# Patient Record
Sex: MALE | Race: WHITE | NOT HISPANIC OR LATINO | Employment: UNEMPLOYED | URBAN - METROPOLITAN AREA
[De-identification: names, ages, dates, MRNs, and addresses within clinical notes are randomized per-mention and may not be internally consistent; named-entity substitution may affect disease eponyms.]

---

## 2017-12-05 ENCOUNTER — OFFICE VISIT (OUTPATIENT)
Dept: PSYCHIATRY | Facility: CLINIC | Age: 57
End: 2017-12-05

## 2017-12-05 DIAGNOSIS — G89.29 OTHER CHRONIC PAIN: Primary | ICD-10-CM

## 2017-12-05 DIAGNOSIS — F43.21 SITUATIONAL DEPRESSION: ICD-10-CM

## 2017-12-05 PROCEDURE — 90791 PSYCH DIAGNOSTIC EVALUATION: CPT | Performed by: PSYCHOLOGIST

## 2017-12-10 NOTE — PROGRESS NOTES
PROGRESS NOTE  Data:  Good Null is a 57 y.o. male who met with the undersigned for a scheduled individual outpatient psychotherapy session from 3:00 - 3:45pm.      Clinical Maneuvering/Intervention:      Pt talked about struggling with chronic pain and suffering from several other health problems (fibromyalgia, high blood pressure, high cholesterol, etc). He was pleasant, talkative, and forthcoming with personal information. A psychological evaluation was conducted in order to assess past and current level of functioning. Areas assessed included, but were not limited to: perception of social support, perception of ability to face and deal with challenges in life (positive functioning), anxiety symptoms, depressive symptoms, perspective on beliefs/belief system, coping skills for stress, intelligence level, etc. Therapeutic rapport was established quite easily. Interventions conducted today were geared towards pain symptom management, perspective taking, and identifying coping skills for stress. Education was also provided as to the nature of counseling with this therapist and what to expect in subsequent sessions. A treatment plan was initiated tailored to meeting pt’s presenting needs. The pt was encouraged to return for additional sessions as needed/desired by the pt.     Assessment      The pt presents as suffering from chronic pain and situational depression. He has several good coping skills for stress including a satisfying marriage, strong amy in God, and has an ability to use humor in difficult situations, however he tends to feel defeated and depressed due to not being able to work/provide for his family (which, in turn, impairs his self-esteem).     Despite suffering from situational depression, from a psychological standpoint, the pt presents as a good candidate to receive the pain pump (or neurostimulator should this later be decided) as he is motivated to receive it, trusts his pain physician, and  is likely to follow his physician's instructions. He would benefit from additional clarification about steps involved in the trial for the pump as he seemed nervous about the odds that it will help diminish his pain.       Mental Status Exam  Hygiene:  good  Dress: normal  Attitude:  Cooperative and proactive  Motor Activity: normal  Speech: normal  Mood:  depressed and nervous  Affect:  congruent  Thought Processes: normal  Thought Content:  normal  Suicidal Thoughts:  not endorsed  Homicidal Thoughts:  not endorsed  Crisis Safety Plan: not needed   Hallucinations:  none      Patient's Support Network Includes:  family, friends      Progress toward goal: there is evidence to suggest that he is becoming more proactive in improving the quality of his life by addressing emotional issues and getting pain treatment.       Functional Status: moderate      Prognosis: depression situational, chronic pain      Plan      Pt will adhere to medication regimen as prescribed. Pt will follow up with his pain physician in order to get pain treated and follow up with a psychotherapist in order to learn ways to better manage his moods.     Disha Diaz, PhD, LP

## 2018-07-09 ENCOUNTER — OFFICE VISIT (OUTPATIENT)
Dept: NEUROSURGERY | Facility: CLINIC | Age: 58
End: 2018-07-09

## 2018-07-09 VITALS
TEMPERATURE: 98 F | DIASTOLIC BLOOD PRESSURE: 80 MMHG | SYSTOLIC BLOOD PRESSURE: 140 MMHG | BODY MASS INDEX: 42.68 KG/M2 | WEIGHT: 250 LBS | HEIGHT: 64 IN

## 2018-07-09 DIAGNOSIS — M54.50 CHRONIC MIDLINE LOW BACK PAIN WITHOUT SCIATICA: Primary | ICD-10-CM

## 2018-07-09 DIAGNOSIS — G89.29 CHRONIC MIDLINE LOW BACK PAIN WITHOUT SCIATICA: Primary | ICD-10-CM

## 2018-07-09 PROCEDURE — 99214 OFFICE O/P EST MOD 30 MIN: CPT | Performed by: PHYSICIAN ASSISTANT

## 2018-07-09 RX ORDER — OMEPRAZOLE 40 MG/1
40 CAPSULE, DELAYED RELEASE ORAL DAILY
COMMUNITY

## 2018-07-09 RX ORDER — MULTIVITAMIN
1 TABLET ORAL DAILY
COMMUNITY

## 2018-07-09 RX ORDER — PREGABALIN 150 MG/1
150 CAPSULE ORAL 2 TIMES DAILY
COMMUNITY

## 2018-07-09 RX ORDER — VERAPAMIL HYDROCHLORIDE 240 MG/1
240 CAPSULE, EXTENDED RELEASE ORAL NIGHTLY
COMMUNITY
End: 2018-11-05

## 2018-07-09 RX ORDER — BUSPIRONE HYDROCHLORIDE 15 MG/1
15 TABLET ORAL 3 TIMES DAILY
COMMUNITY

## 2018-07-09 RX ORDER — CITALOPRAM 40 MG/1
40 TABLET ORAL DAILY
COMMUNITY

## 2018-07-09 RX ORDER — SPIRONOLACTONE 25 MG/1
25 TABLET ORAL DAILY
COMMUNITY

## 2018-07-09 RX ORDER — ATORVASTATIN CALCIUM 40 MG/1
40 TABLET, FILM COATED ORAL DAILY
COMMUNITY

## 2018-07-09 RX ORDER — RANITIDINE 300 MG/1
300 TABLET ORAL NIGHTLY
COMMUNITY

## 2018-07-09 NOTE — PROGRESS NOTES
Patient: Good Null  : 1960    Primary Care Provider: Ty Gutierrez MD      Chief Complaint: Low back pain    History of Present Illness:       Patient is a 58-year-old male who has had greater than 30 years of chronic back pain.  Patient has been recently treated by Dr. cabrera, but has not received any injections.  Dr. Mccloud had discussed a pain pump trial as well, but all these seem to be rejected by his insurance.    In the last 2 or 3 months.  Patient's pain has gotten much worse with much activity at all.  He has exquisite mid to low back pain and no real radicular symptoms.  Patient also states he has neck pain but has no radiculopathy in upper extremities.    Patient states that there is no activities that have helped him get better.  Patient refuses to take any opioid narcotics.  Patient also does not care for her nonsteroidal anti-inflammatories.    Patient presents today with x-rays of his lumbar spine and no new films.  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  Patient has not done formal physical therapy.    Review of Systems   Constitutional: Positive for activity change and fatigue. Negative for appetite change, chills, diaphoresis, fever and unexpected weight change.   HENT: Negative for congestion, dental problem, drooling, ear discharge, ear pain, facial swelling, hearing loss, mouth sores, nosebleeds, postnasal drip, rhinorrhea, sinus pressure, sneezing, sore throat, tinnitus, trouble swallowing and voice change.    Eyes: Positive for pain. Negative for photophobia, discharge, redness, itching and visual disturbance.   Respiratory: Positive for cough. Negative for apnea, choking, chest tightness, shortness of breath, wheezing and stridor.    Cardiovascular: Negative for chest pain, palpitations and leg swelling.   Gastrointestinal: Negative for abdominal distention, abdominal pain, anal bleeding, blood in stool,  "constipation, diarrhea, nausea, rectal pain and vomiting.   Endocrine: Negative for cold intolerance, heat intolerance, polydipsia, polyphagia and polyuria.   Genitourinary: Negative for decreased urine volume, difficulty urinating, dysuria, enuresis, flank pain, frequency, genital sores, hematuria and urgency.   Musculoskeletal: Positive for arthralgias, back pain, gait problem, joint swelling, myalgias, neck pain and neck stiffness.   Skin: Negative for color change, pallor, rash and wound.   Allergic/Immunologic: Negative for environmental allergies, food allergies and immunocompromised state.   Neurological: Positive for dizziness and headaches. Negative for tremors, seizures, syncope, facial asymmetry, speech difficulty, weakness, light-headedness and numbness.   Hematological: Negative for adenopathy. Does not bruise/bleed easily.   Psychiatric/Behavioral: Negative for agitation, behavioral problems, confusion, decreased concentration, dysphoric mood, hallucinations, self-injury, sleep disturbance and suicidal ideas. The patient is not nervous/anxious and is not hyperactive.    All other systems reviewed and are negative.      Past Medical History:     Past Medical History:   Diagnosis Date   • Arthritis    • Fibromyalgia    • Hypertension    • Low back pain        Family History:   History reviewed. No pertinent family history.    Social History:    reports that he has never smoked. His smokeless tobacco use includes Snuff. He reports that he does not drink alcohol or use drugs.   SMOKING STATUS: Nonsmoker    Surgical History:     Past Surgical History:   Procedure Laterality Date   • WRIST SURGERY         Allergies:   Codeine and Penicillins    Physical Exam:    Vital Signs:/80 (BP Location: Left arm)   Temp 98 °F (36.7 °C)   Ht 162.6 cm (64\")   Wt 113 kg (250 lb)   BMI 42.91 kg/m²    BMI: Body mass index is 42.91 kg/m².    GENEREAL:   The patient is in no acute distress, and is able to answer all " questions appropriately.  HEENT: Pupils are equal and reactive to light.  Visual fields are full.  Extraocular movements are intact without nystagmus.  There is no evidence of trauma.  Neck: Supple without lymphadenopathy  Cardiovascular: Regular rate and rhythm   Abdomen: Soft, non-tender, non-distended.    Musculoskeletal: The patient’s strength is intact in upper and lower extremities upon direct testing.   strength is 5 out of 5 bilaterally. Shoulder abduction is 5 out of 5.  Dorsiflexion and plantarflexion are equal bilaterally as well as the hip flexion against resistance.  The patient’s gait is normal without antalgia.  Neurologic: The patient is alert and oriented by 3.  Recent memory, language, attention span, and fund of knowledge are all with within normal limits.  There is no evidence of central motor drift. No facial droop.  No difficulty with rapid alternating movements.  Sensation is equal bilaterally with no deficit.    Reflexes are 2+ at biceps, triceps, brachioradialis, as well as the patellar and Achilles tendon bilaterally.      Medical Decision Making    Data Review:   X-rays of the lumbar spine were reviewed, as well as MRI of the cervical, thoracic and lumbar spine from January 2017.  No real issues seen on the MRIs from 2017, however, they are a year and 8 months old    Diagnosis:   Chronic low back pain    Treatment Options:   I discussed with the patient.  The back pain is very difficult to treat, especially surgically.  I have ordered a lumbar spine flexion extension x-ray as well as MRI of the lumbar spine.  We also put the patient in physical therapy in the time being.  I'll see the patient back after these studies are performed.  I have reviewed with the patient.  Signs and symptoms of necessitate a referral back to us in a more urgent fashion.    It has been a pleasure providing neurosurgical care.    Allen Moss PA-C      No diagnosis found.

## 2018-09-24 ENCOUNTER — HOSPITAL ENCOUNTER (OUTPATIENT)
Dept: GENERAL RADIOLOGY | Facility: HOSPITAL | Age: 58
Discharge: HOME | End: 2018-09-24
Admitting: PHYSICIAN ASSISTANT

## 2018-09-24 ENCOUNTER — OFFICE VISIT (OUTPATIENT)
Dept: NEUROSURGERY | Facility: CLINIC | Age: 58
End: 2018-09-24

## 2018-09-24 DIAGNOSIS — M54.50 CHRONIC MIDLINE LOW BACK PAIN WITHOUT SCIATICA: Primary | ICD-10-CM

## 2018-09-24 DIAGNOSIS — M54.50 CHRONIC MIDLINE LOW BACK PAIN WITHOUT SCIATICA: ICD-10-CM

## 2018-09-24 DIAGNOSIS — G89.29 CHRONIC MIDLINE LOW BACK PAIN WITHOUT SCIATICA: Primary | ICD-10-CM

## 2018-09-24 DIAGNOSIS — G89.29 CHRONIC MIDLINE LOW BACK PAIN WITHOUT SCIATICA: ICD-10-CM

## 2018-09-24 PROCEDURE — 99213 OFFICE O/P EST LOW 20 MIN: CPT | Performed by: PHYSICIAN ASSISTANT

## 2018-09-24 PROCEDURE — 72120 X-RAY BEND ONLY L-S SPINE: CPT

## 2018-09-24 RX ORDER — TRIAMTERENE AND HYDROCHLOROTHIAZIDE 37.5; 25 MG/1; MG/1
1 TABLET ORAL DAILY
Refills: 2 | COMMUNITY
Start: 2018-08-30

## 2018-09-24 RX ORDER — FLUTICASONE PROPIONATE 50 MCG
1 SPRAY, SUSPENSION (ML) NASAL DAILY
Refills: 5 | COMMUNITY
Start: 2018-08-30

## 2018-09-24 RX ORDER — IBUPROFEN 800 MG/1
TABLET ORAL
Refills: 6 | COMMUNITY
Start: 2018-08-30

## 2018-09-24 NOTE — PROGRESS NOTES
Patient: Good Null  : 1960    Primary Care Provider: Ty Gutierrez MD      Chief Complaint: Upper back pain    History of Present Illness:        Patient is a 58-year-old male who has had greater than 30 years of chronic back pain.  Patient has been recently treated by Dr. Thompson, but has not received any injections.  Dr. Thompson had discussed a pain pump trial as well, but all these seem to be rejected by his insurance.    At last visit, we have discussed his back pain.  It had been getting worse since mid-March and is easily exacerbated by little to no activity.  Patient time was stating his mid to low back pain with no real radicular symptoms.  After receiving MRI.  We will explore it and did not notice any surgical pathology only a mild stenosis at L2-3.  Patient states that his pain is higher up on the scan and what we can see.  Scan showed up to T10 and there was little to no stenosis.    We had a very large conversation about his back pain and whether surgery would be able to help.  Patient says he sick and tired of nobody fixing his pain.  I long discussion regarding his back pain and what we have to fix it.  Patient is very resistant on taking opioid pain medicines and does not want to go back to Dr. Thompson because insurance denied his pain pump and he feels that it would be a waste of time.    He directly wanted me to fix his back pain and very cautiously discussed with him the fact that back pain is not easily curable with any type of back surgery.    I discussed the patients have to get a thoracic spine MRI because he has reports of a better loose disc and thoracic spine.  Patient denies any suspended thoracic sensory level.  No signs of clonus or leg weakness.    Review of Systems   Constitutional: Positive for activity change, diaphoresis and fatigue. Negative for appetite change, chills, fever and unexpected weight change.   HENT: Negative for congestion, dental problem, drooling, ear  discharge, ear pain, facial swelling, hearing loss, mouth sores, nosebleeds, postnasal drip, rhinorrhea, sinus pressure, sneezing, sore throat, tinnitus, trouble swallowing and voice change.    Eyes: Negative for photophobia, pain, discharge, redness, itching and visual disturbance.   Respiratory: Positive for shortness of breath. Negative for apnea, cough, choking, chest tightness, wheezing and stridor.    Cardiovascular: Positive for palpitations and leg swelling. Negative for chest pain.   Gastrointestinal: Negative for abdominal distention, abdominal pain, anal bleeding, blood in stool, constipation, diarrhea, nausea, rectal pain and vomiting.   Endocrine: Negative for cold intolerance, heat intolerance, polydipsia, polyphagia and polyuria.   Genitourinary: Negative for decreased urine volume, difficulty urinating, dysuria, enuresis, flank pain, frequency, genital sores, hematuria and urgency.   Musculoskeletal: Positive for arthralgias, back pain, gait problem, joint swelling, myalgias, neck pain and neck stiffness.   Skin: Negative for color change, pallor, rash and wound.   Allergic/Immunologic: Negative for environmental allergies, food allergies and immunocompromised state.   Neurological: Positive for tremors, weakness and light-headedness. Negative for dizziness, seizures, syncope, facial asymmetry, speech difficulty, numbness and headaches.   Hematological: Negative for adenopathy. Does not bruise/bleed easily.   Psychiatric/Behavioral: Positive for decreased concentration, dysphoric mood and sleep disturbance. Negative for agitation, behavioral problems, confusion, hallucinations, self-injury and suicidal ideas. The patient is not nervous/anxious and is not hyperactive.    All other systems reviewed and are negative.      Past Medical History:     Past Medical History:   Diagnosis Date   • Arthritis    • Fibromyalgia    • Hypertension    • Low back pain        Family History:   History reviewed. No  pertinent family history.    Social History:    reports that he has never smoked. His smokeless tobacco use includes Snuff. He reports that he does not drink alcohol or use drugs.   SMOKING STATUS: Nonsmoker    Surgical History:     Past Surgical History:   Procedure Laterality Date   • WRIST SURGERY         Allergies:   Codeine and Penicillins    Physical Exam:    Vital Signs:There were no vitals taken for this visit.   BMI: There is no height or weight on file to calculate BMI.    GENEREAL:   The patient is in no acute distress, and is able to answer all questions appropriately.  HEENT: Pupils are equal and reactive to light.  Visual fields are full.  Extraocular movements are intact without nystagmus.  There is no evidence of trauma.  Neck: Supple without lymphadenopathy  Cardiovascular: Regular rate and rhythm   Abdomen: Soft, non-tender, non-distended.    Musculoskeletal: The patient’s strength is intact in upper and lower extremities upon direct testing.   strength is 5 out of 5 bilaterally. Shoulder abduction is 5 out of 5.  Dorsiflexion and plantarflexion are equal bilaterally as well as the hip flexion against resistance.  The patient’s gait is antalgic.  Neurologic: The patient is alert and oriented by 3.  Recent memory, language, attention span, and fund of knowledge are all with within normal limits.  There is no evidence of central motor drift. No facial droop.  No difficulty with rapid alternating movements.  Sensation is equal bilaterally with no deficit.    Reflexes are 2+ at biceps, triceps, brachioradialis, as well as the patellar and Achilles tendon bilaterally.    Medical Decision Making    Data Review:   MRI the lumbar spine was reviewed with patient and we discussed the findings.  Patient very adamantly determined that his pain is higher up than the scan that we were given.  Patient pointed up to his lower to mid thoracic spine.  This was not consistent with our previous conversation as to  being low to mid back.     Diagnosis:   Chronic back pain  Decreased stamina  Obesity    Treatment Options:   Patient does not bruise get his lumbar flexion extension x-rays to rule out any mobile spondylolisthesis and lumbar spine.  We will add on a thoracic x-ray to ensure that he does not have any compression fractures that could be contributing to his pain that seemed to be  Removed.  North.  We will also pursue a thoracic MRI to rule out any meniscal herniations or bulges/annular tears, they can be causing this back pain.  I have had a very ron conversation with the patient that over the last 30 years with diagnosis of fibromyalgia, chronic back pain.  There is little likelihood that we will find anything that is acutely surgical that we can fix.  Patient is fairly displeased with that,, however reiterated that we will not put the patient harms way and we have to find at physical exam finding that would correlate with a particular image set that we thought we could fix prior to wanting any surgical procedure.  Patient and wife understood that.    It has been a pleasure providing neurosurgical care.    Allen Moss PA-C       Diagnosis Plan   1. Chronic midline low back pain without sciatica

## 2018-11-05 ENCOUNTER — OFFICE VISIT (OUTPATIENT)
Dept: NEUROSURGERY | Facility: CLINIC | Age: 58
End: 2018-11-05

## 2018-11-05 VITALS — HEIGHT: 64 IN | WEIGHT: 250 LBS | BODY MASS INDEX: 42.68 KG/M2 | TEMPERATURE: 98 F

## 2018-11-05 DIAGNOSIS — M54.50 CHRONIC MIDLINE LOW BACK PAIN WITHOUT SCIATICA: Primary | ICD-10-CM

## 2018-11-05 DIAGNOSIS — G89.29 CHRONIC MIDLINE LOW BACK PAIN WITHOUT SCIATICA: Primary | ICD-10-CM

## 2018-11-05 PROCEDURE — 99213 OFFICE O/P EST LOW 20 MIN: CPT | Performed by: PHYSICIAN ASSISTANT

## 2018-11-05 RX ORDER — VERAPAMIL HYDROCHLORIDE 240 MG/1
TABLET, FILM COATED, EXTENDED RELEASE ORAL
Refills: 5 | COMMUNITY
Start: 2018-10-02

## 2018-11-05 RX ORDER — DULOXETIN HYDROCHLORIDE 60 MG/1
CAPSULE, DELAYED RELEASE ORAL
Refills: 3 | COMMUNITY
Start: 2018-10-30

## 2018-11-05 NOTE — PROGRESS NOTES
Patient: Bryan Null  : 1960    Primary Care Provider: Ty Gutierrez MD      Chief Complaint: Back pain    History of Present Illness:        Patient is a 58-year-old male who has had greater than 30 years of chronic back pain.  Patient has been recently treated by Dr. Thompson, but has not received any injections.  Dr. Thompson had discussed a pain pump trial as well, but all these seem to be rejected by his insurance.     At last visit, we have discussed his back pain.  It had been getting worse since mid-March and is easily exacerbated by little to no activity.  Patient time was stating his mid to low back pain with no real radicular symptoms.  After receiving MRI.  We will explore it and did not notice any surgical pathology only a mild stenosis at L2-3.  Patient states that his pain is higher up on the scan and what we can see.  Scan showed up to T10 and there was little to no stenosis.    Therefore we acquired a thoracic spine MRI that showed no progression as compared to the one in 2017.  There is no major disc herniations or cord impingements.  This was reviewed with Dr. jorgensen and stated to be a mostly normal exam other than minor disc bulges.    Review of Systems   Constitutional: Negative for activity change, appetite change, chills, diaphoresis, fatigue, fever and unexpected weight change.   HENT: Negative for congestion, dental problem, drooling, ear discharge, ear pain, facial swelling, hearing loss, mouth sores, nosebleeds, postnasal drip, rhinorrhea, sinus pressure, sneezing, sore throat, tinnitus, trouble swallowing and voice change.    Eyes: Negative for photophobia, pain, discharge, redness, itching and visual disturbance.   Respiratory: Negative for apnea, cough, choking, chest tightness, shortness of breath, wheezing and stridor.    Cardiovascular: Negative for chest pain, palpitations and leg swelling.   Gastrointestinal: Negative for abdominal distention, abdominal pain, anal  "bleeding, blood in stool, constipation, diarrhea, nausea, rectal pain and vomiting.   Endocrine: Negative for cold intolerance, heat intolerance, polydipsia, polyphagia and polyuria.   Genitourinary: Negative for decreased urine volume, difficulty urinating, dysuria, enuresis, flank pain, frequency, genital sores, hematuria and urgency.   Musculoskeletal: Positive for arthralgias, back pain, gait problem, joint swelling, myalgias, neck pain and neck stiffness.   Skin: Negative for color change, pallor, rash and wound.   Allergic/Immunologic: Negative for environmental allergies, food allergies and immunocompromised state.   Neurological: Positive for weakness and numbness. Negative for dizziness, tremors, seizures, syncope, facial asymmetry, speech difficulty, light-headedness and headaches.   Hematological: Negative for adenopathy. Does not bruise/bleed easily.   Psychiatric/Behavioral: Positive for agitation, dysphoric mood and sleep disturbance. Negative for behavioral problems, confusion, decreased concentration, hallucinations, self-injury and suicidal ideas. The patient is nervous/anxious. The patient is not hyperactive.    All other systems reviewed and are negative.      Past Medical History:     Past Medical History:   Diagnosis Date   • Arthritis    • Fibromyalgia    • Hypertension    • Low back pain        Family History:   History reviewed. No pertinent family history.    Social History:    reports that he has never smoked. His smokeless tobacco use includes Snuff. He reports that he does not drink alcohol or use drugs.   SMOKING STATUS: Nonsmoker    Surgical History:     Past Surgical History:   Procedure Laterality Date   • WRIST SURGERY         Allergies:   Codeine and Penicillins    Physical Exam:    Vital Signs:Temp 98 °F (36.7 °C)   Ht 162.6 cm (64.02\")   Wt 113 kg (250 lb)   BMI 42.89 kg/m²    BMI: Body mass index is 42.89 kg/m².    GENERAL:   The patient is in no acute distress, and is able to " answer all questions appropriately.  Musculoskeletal: Pain limited exam The patient’s strength is intact in upper and lower extremities upon direct testing.  Dorsiflexion and plantarflexion are equal bilaterally @ 5/5. Hip flexion against resistance.  The patient’s gait is normal with antalgia.  Neurologic: The patient is alert and oriented by 3.  Recent memory, language, attention span, and fund of knowledge are all with within normal limits.   Sensation is equal bilaterally with no deficit.    Reflexes are 2+ at the patellar and Achilles tendon bilaterally.      Medical Decision Making    Data Review:   MRI thoracic spine was reviewed with Dr. jorgensen and no acute abnormalities noted.  This is a stable exam from 2017 [facility] radiologist review    Diagnosis:   Chronic back pain    Treatment Options:   I think from neurosurgical standpoint may have little to offer him.  I think that they should continue pursuing a pain pump and/or spinal cord stimulator trial.  I'll help him how I can with disability.  I do know that he is in the process of requiring it.    Patient follow up with us on an as-needed basis    It has been a pleasure providing neurosurgical care.    Allen Moss PA-C       Diagnosis Plan   1. Chronic midline low back pain without sciatica

## 2020-06-17 ENCOUNTER — OUTSIDE FACILITY SERVICE (OUTPATIENT)
Dept: CARDIOLOGY | Facility: CLINIC | Age: 60
End: 2020-06-17

## 2020-06-17 PROCEDURE — 93227 XTRNL ECG REC<48 HR R&I: CPT | Performed by: INTERNAL MEDICINE
